# Patient Record
Sex: MALE | Race: WHITE
[De-identification: names, ages, dates, MRNs, and addresses within clinical notes are randomized per-mention and may not be internally consistent; named-entity substitution may affect disease eponyms.]

---

## 2018-10-16 ENCOUNTER — HOSPITAL ENCOUNTER (EMERGENCY)
Dept: HOSPITAL 97 - ER | Age: 58
Discharge: HOME | End: 2018-10-16
Payer: COMMERCIAL

## 2018-10-16 DIAGNOSIS — I10: ICD-10-CM

## 2018-10-16 DIAGNOSIS — R07.9: Primary | ICD-10-CM

## 2018-10-16 LAB
ALBUMIN SERPL BCP-MCNC: 3.8 G/DL (ref 3.4–5)
ALP SERPL-CCNC: 27 U/L (ref 45–117)
ALT SERPL W P-5'-P-CCNC: 40 U/L (ref 12–78)
AST SERPL W P-5'-P-CCNC: 17 U/L (ref 15–37)
BUN BLD-MCNC: 17 MG/DL (ref 7–18)
GLUCOSE SERPLBLD-MCNC: 92 MG/DL (ref 74–106)
HCT VFR BLD CALC: 42.1 % (ref 39.6–49)
INR BLD: 1.01
LYMPHOCYTES # SPEC AUTO: 1 K/UL (ref 0.7–4.9)
MAGNESIUM SERPL-MCNC: 2.3 MG/DL (ref 1.8–2.4)
MCH RBC QN AUTO: 32.2 PG (ref 27–35)
MCV RBC: 91.6 FL (ref 80–100)
NT-PROBNP SERPL-MCNC: 14 PG/ML (ref ?–125)
PMV BLD: 7.8 FL (ref 7.6–11.3)
POTASSIUM SERPL-SCNC: 4.5 MMOL/L (ref 3.5–5.1)
RBC # BLD: 4.6 M/UL (ref 4.33–5.43)
TROPONIN (EMERG DEPT USE ONLY): < 0.02 NG/ML (ref 0–0.04)

## 2018-10-16 PROCEDURE — 84484 ASSAY OF TROPONIN QUANT: CPT

## 2018-10-16 PROCEDURE — 71045 X-RAY EXAM CHEST 1 VIEW: CPT

## 2018-10-16 PROCEDURE — 80048 BASIC METABOLIC PNL TOTAL CA: CPT

## 2018-10-16 PROCEDURE — 80076 HEPATIC FUNCTION PANEL: CPT

## 2018-10-16 PROCEDURE — 36415 COLL VENOUS BLD VENIPUNCTURE: CPT

## 2018-10-16 PROCEDURE — 83735 ASSAY OF MAGNESIUM: CPT

## 2018-10-16 PROCEDURE — 93005 ELECTROCARDIOGRAM TRACING: CPT

## 2018-10-16 PROCEDURE — 83880 ASSAY OF NATRIURETIC PEPTIDE: CPT

## 2018-10-16 PROCEDURE — 85025 COMPLETE CBC W/AUTO DIFF WBC: CPT

## 2018-10-16 PROCEDURE — 85610 PROTHROMBIN TIME: CPT

## 2018-10-16 PROCEDURE — 99285 EMERGENCY DEPT VISIT HI MDM: CPT

## 2018-10-16 NOTE — EKG
Test Date:    2018-10-16               Test Time:    08:59:57

Technician:   LUCIA                                     

                                                     

MEASUREMENT RESULTS:                                       

Intervals:                                           

Rate:         54                                     

MS:           132                                    

QRSD:         90                                     

QT:           432                                    

QTc:          409                                    

Axis:                                                

P:            49                                     

MS:           132                                    

QRS:          19                                     

T:            31                                     

                                                     

INTERPRETIVE STATEMENTS:                                       

                                                     

Sinus bradycardia

Otherwise normal ECG

No previous ECG available for comparison



Electronically Signed On 10-16-18 12:32:51 CDT by Marcelo Woodson

## 2018-10-16 NOTE — EKG
Test Date:    2018-10-16               Test Time:    11:55:35

Technician:   AMAYA                                     

                                                     

MEASUREMENT RESULTS:                                       

Intervals:                                           

Rate:         53                                     

SD:           134                                    

QRSD:         98                                     

QT:           446                                    

QTc:          418                                    

Axis:                                                

P:            45                                     

SD:           134                                    

QRS:          17                                     

T:            26                                     

                                                     

INTERPRETIVE STATEMENTS:                                       

                                                     

Sinus bradycardia

Otherwise normal ECG

Compared to ECG 10/16/2018 08:59:57

No significant changes



Electronically Signed On 10-16-18 12:32:40 CDT by Marcelo Woodson

## 2018-10-16 NOTE — EDPHYS
Physician Documentation                                                                           

 [facility]                                                                

Name: Jose Alberto Cardoza Jr                                                                           

Age: 57 yrs                                                                                       

Sex: Male                                                                                         

: 1960                                                                                   

MRN: [id number]                                                                                   

Arrival Date: 10/16/2018                                                                          

Time: 08:56                                                                                       

Account#: [id number]                                                                            

Bed 16                                                                                            

Private MD:                                                                                       

ED Physician Rishi Lawton                                                                         

HPI:                                                                                              

10/16                                                                                             

09:10 This 57 yrs old  Male presents to ER via Wheelchair with complaints of Chest   cp  

      Pain.                                                                                       

09:10 The patient or guardian reports chest pain that is located primarily in the anterior    cp  

      chest wall, left.                                                                           

09:10 The pain does not radiate. The chest pain is described as sharp. Duration: The patient  cp  

      or guardian reports multiple episodes, that wax and wane, with no pattern. Modifying        

      factors: The symptoms are alleviated by nothing. the symptoms are aggravated by nothing.    

                                                                                                  

Historical:                                                                                       

- Allergies:                                                                                      

09:02 No Known Allergies;                                                                     ch  

- Home Meds:                                                                                      

09:02 Norco Oral [Active]; Soma Oral [Active]; Lisinopril Oral [Active]; Buspirone Oral       ch  

      [Active];                                                                                   

- PMHx:                                                                                           

09:02 Hypertension; wound to LL leg; chronic hip pain;                                        ch  

- PSHx:                                                                                           

09:02 heaven hip replacements; Hernia repair;                                                    ch  

                                                                                                  

- Immunization history:: Adult Immunizations up to date, Last tetanus immunization: not           

  indicated for visit today. Flu vaccine is not up to date.                                       

- Social history:: Smoking status: Patient/guardian denies using tobacco,                         

  Patient/guardian denies using alcohol, street drugs.                                            

- Ebola Screening: : Patient negative for fever greater than or equal to 101.5 degrees            

  Fahrenheit, and additional compatible Ebola Virus Disease symptoms Patient denies               

  exposure to infectious person Patient denies travel to an Ebola-affected area in the            

  21 days before illness onset No symptoms or risks identified at this time.                      

                                                                                                  

                                                                                                  

ROS:                                                                                              

09:15 Constitutional: Negative for body aches, chills, fever, poor PO intake.                 cp  

09:15 Eyes: Negative for injury, pain, redness, and discharge.                                cp  

09:15 ENT: Negative for drainage from ear(s), ear pain, sore throat, difficulty swallowing,       

      difficulty handling secretions.                                                             

09:15 Cardiovascular: Positive for chest pain, Negative for edema, palpitations.                  

09:15 Respiratory: Negative for cough, shortness of breath, wheezing.                             

09:15 Abdomen/GI: Negative for abdominal pain, nausea, vomiting, and diarrhea, black/tarry        

      stool, rectal bleeding.                                                                     

09:15 Back: Negative for pain at rest, pain with movement, radiated pain.                         

09:15 : Negative for urinary symptoms.                                                          

09:15 Skin: Negative for cellulitis, rash.                                                        

09:15 Neuro: Negative for altered mental status, headache, syncope, near syncope, weakness.       

09:15 All other systems are negative.                                                             

                                                                                                  

Exam:                                                                                             

09:05 ECG was reviewed by the Attending Physician.                                            cp  

09:20 Constitutional: The patient appears in no acute distress, alert, awake,                 cp  

      non-diaphoretic, non-toxic, well developed, well nourished.                                 

09:20 Head/Face:  Normocephalic, atraumatic.                                                  cp  

09:20 Eyes: Periorbital structures: appear normal, Conjunctiva: normal, no exudate, no            

      injection, Sclera: no appreciated abnormality, Lids and lashes: appear normal,              

      bilaterally.                                                                                

09:20 ENT: External ear(s): are unremarkable, Nose: is normal, Mouth: is normal.                  

09:20 Neck: ROM/movement: is normal, is supple, without pain, no range of motions                 

      limitations, no nuchal rigidity.                                                            

09:20 Chest/axilla: Inspection: normal, Palpation: is normal, no crepitus, no tenderness.         

09:20 Cardiovascular: Rate: bradycardic, Rhythm: regular, Pulses: Pulses are 2+ in right          

      radial artery and left radial artery. Heart sounds: murmur, not appreciated, rub, not       

      appreciated, gallop, not appreciated, Edema: is not appreciated, JVD: is not                

      appreciated.                                                                                

09:20 Respiratory: the patient does not display signs of respiratory distress,  Respirations:     

      normal, no use of accessory muscles, no retractions, no splinting, no tachypnea,            

      labored breathing, is not present, Breath sounds: are clear throughout, no decreased        

      breath sounds, no stridor, no wheezing.                                                     

09:20 Abdomen/GI: Inspection: abdomen appears normal, Bowel sounds: active, all quadrants,        

      Palpation: abdomen is soft and non-tender, in all quadrants, rebound tenderness, is not     

      appreciated, voluntary guarding, is not appreciated, involuntary guarding, is not           

      appreciated.                                                                                

09:20 Back: pain, is absent, ROM is normal.                                                       

09:20 Skin: cellulitis, is not appreciated, no rash present.                                      

09:20 Neuro: Orientation: to person, place \T\ time. Mentation: is normal, Cerebellar function:   

      is grossly normal, Motor: moves all fours, strength is normal, Sensation: is normal.        

12:03 ECG was reviewed by the Attending Physician.                                            cp  

                                                                                                  

Vital Signs:                                                                                      

09:02 Weight 97.52 kg; Height 6 ft. (182.88 cm); Pain 6/10;                                   ch  

09:37  / 96; Pulse 54; Resp 18; Pulse Ox 97% on R/A;                                    ph  

10:56  / 86; Pulse 56; Resp 16; Pulse Ox 95% on R/A;                                    ph  

12:31  / 96; Pulse 51; Resp 16; Pulse Ox 96% on R/A;                                    ph  

13:21  / 89; Pulse 54; Resp 18; Temp 98.0; Pulse Ox 99% on R/A;                         ph  

09:02 Body Mass Index 29.16 (97.52 kg, 182.88 cm)                                             ch  

09:02 6 in leg, 2 in chest.                                                                   ch  

                                                                                                  

MDM:                                                                                              

08:57 Patient medically screened.                                                             cp  

                                                                                                  

10/16                                                                                             

09:04 Order name: Basic Metabolic Panel; Complete Time: 09:55                                 cp  

10/16                                                                                             

09:55 Interpretation: Normal except: GFR 63.                                                  cp  

10/16                                                                                             

09:04 Order name: CBC with Diff; Complete Time: 09:48                                         cp  

10/16                                                                                             

09:48 Interpretation: Normal except: EOSINOPHIL % 5.0.                                        cp  

10/16                                                                                             

09:04 Order name: LFT's; Complete Time: 09:55                                                 cp  

10/16                                                                                             

09:56 Interpretation: Normal except: ALK 27.                                                  cp  

10/16                                                                                             

09:04 Order name: Magnesium; Complete Time: 09:55                                             cp  

10/16                                                                                             

09:04 Order name: NT PRO-BNP; Complete Time: 09:55                                            cp  

10/16                                                                                             

09:04 Order name: PT-INR; Complete Time: 09:48                                                cp  

10/16                                                                                             

09:04 Order name: Troponin (emerg Dept Use Only); Complete Time: 09:55                        cp  

10/16                                                                                             

09:55 Interpretation: Within normal limits: TROPED < 0.02.                                    cp  

10/16                                                                                             

09:04 Order name: XRAY Chest (1 view); Complete Time: 13:08                                   cp  

10/16                                                                                             

09:04 Order name: EKG; Complete Time: 09:05                                                   cp  

10/16                                                                                             

09:04 Order name: Cardiac monitoring; Complete Time: 09:30                                    cp  

10/16                                                                                             

09:04 Order name: EKG - Nurse/Tech; Complete Time: 09:31                                      cp  

10/16                                                                                             

11:48 Order name: EKG; Complete Time: 11:48                                                   cp  

10/16                                                                                             

11:48 Order name: Troponin I; Complete Time: 13:08                                            cp  

10/16                                                                                             

09:04 Order name: IV Saline Lock; Complete Time: 09:31                                        cp  

10/16                                                                                             

09:04 Order name: Labs collected and sent; Complete Time: 09:31                               cp  

10/16                                                                                             

09:04 Order name: O2 Per Protocol; Complete Time: 09:31                                       cp  

10/16                                                                                             

09:04 Order name: O2 Sat Monitoring; Complete Time: 09:38                                     cp  

10/16                                                                                             

11:48 Order name: EKG - Nurse/Tech; Complete Time: 12:31                                      cp  

                                                                                                  

EC:05 Rate is 54 beats/min. Rhythm is regular. NJ interval is normal. QRS interval is normal. cp  

      QT interval is normal. Interpreted by me. Reviewed by me.                                   

12:03 Rate is 53 beats/min. Rhythm is regular. NJ interval is normal. QRS interval is normal. cp  

      QT interval is normal. Interpreted by me. Reviewed by me.                                   

                                                                                                  

Administered Medications:                                                                         

09:23 Drug: Aspirin Chewable Tablet 324 mg Route: PO;                                         ph  

10:54 Follow up: Response: No adverse reaction                                                ph  

                                                                                                  

                                                                                                  

Disposition:                                                                                      

16:09 Co-signature as Attending Physician, Rishi Lawton MD.                                    rn  

                                                                                                  

Disposition:                                                                                      

10/16/18 13:09 Discharged to Home. Impression: Chest pain, unspecified.                           

- Condition is Stable.                                                                            

- Discharge Instructions: Nonspecific Chest Pain, Aspirin and Your Heart.                         

                                                                                                  

- Medication Reconciliation Form, Thank You Letter, Antibiotic Education, Prescription            

  Opioid Use form.                                                                                

- Follow up: Shiraz Taylor MD; When: 1 - 2 days; Reason: chest pain.                             

- Problem is new.                                                                                 

- Symptoms have improved.                                                                         

                                                                                                  

                                                                                                  

                                                                                                  

Signatures:                                                                                       

Dispatcher [employer]                           EDMS                                                 

Keri Cruz RN RN ch Nieto, Roman, MD MD rn Hall, Patricia, RN RN ph Page, Corey, PA PA cp                                                   

                                                                                                  

Corrections: (The following items were deleted from the chart)                                    

13:22 13:09 10/16/2018 13:09 Discharged to Home. Impression: Chest pain, unspecified.         ph  

      Condition is Stable. Forms are Medication Reconciliation Form, Thank You Letter,            

      Antibiotic Education, Prescription Opioid Use. Follow up: Shiraz Taylor; When: 1 - 2         

      days; Reason: chest pain. Problem is new. Symptoms have improved. cp                        

                                                                                                  

**************************************************************************************************

## 2018-10-16 NOTE — ER
Nurse's Notes                                                                                     

 [facility]                                                                

Name: Jose Alberto Cardoza Jr                                                                           

Age: 57 yrs                                                                                       

Sex: Male                                                                                         

: 1960                                                                                   

MRN: [id number]                                                                                   

Arrival Date: 10/16/2018                                                                          

Time: 08:56                                                                                       

Account#: [id number]                                                                            

Bed 16                                                                                            

Private MD:                                                                                       

Diagnosis: Chest pain, unspecified                                                                

                                                                                                  

Presentation:                                                                                     

10/16                                                                                             

08:59 Presenting complaint: Patient states: chest pains on and off for 2 years. I was in        

      wound healing today for my Lleg wound, for 6 weeks. I told them I was having slight         

      chest pains and Dr. Edwards said to come to the ER. my leg hurts at a 6, my chest          

      hurts at a 2. Transition of care: patient was not received from another setting of          

      care. Onset of symptoms was . Risk Assessment: Do you want to hurt yourself or          

      someone else? Patient reports no desire to harm self or others. Risk Assessment: Do you     

      want to hurt yourself or someone else?. Initial Sepsis Screen: Does the patient meet        

      any 2 criteria? No. Patient's initial sepsis screen is negative. Does the patient have      

      a suspected source of infection? No. Patient's initial sepsis screen is negative. Care      

      prior to arrival: None.                                                                     

08:59 Method Of Arrival: Wheelchair                                                             

08:59 Acuity: SHANTELL 3                                                                             

                                                                                                  

Triage Assessment:                                                                                

09:02 General: Appears in no apparent distress. comfortable, Behavior is calm, cooperative,   ch  

      appropriate for age. Pain: Complains of pain in chest and left leg.                         

                                                                                                  

Historical:                                                                                       

- Allergies:                                                                                      

09:02 No Known Allergies;                                                                     ch  

- Home Meds:                                                                                      

09:02 Norco Oral [Active]; Soma Oral [Active]; Lisinopril Oral [Active]; Buspirone Oral       ch  

      [Active];                                                                                   

- PMHx:                                                                                           

09:02 Hypertension; wound to LL leg; chronic hip pain;                                          

- PSHx:                                                                                           

09:02 heaven hip replacements; Hernia repair;                                                      

                                                                                                  

- Immunization history:: Adult Immunizations up to date, Last tetanus immunization: not           

  indicated for visit today. Flu vaccine is not up to date.                                       

- Social history:: Smoking status: Patient/guardian denies using tobacco,                         

  Patient/guardian denies using alcohol, street drugs.                                            

- Ebola Screening: : Patient negative for fever greater than or equal to 101.5 degrees            

  Fahrenheit, and additional compatible Ebola Virus Disease symptoms Patient denies               

  exposure to infectious person Patient denies travel to an Ebola-affected area in the            

  21 days before illness onset No symptoms or risks identified at this time.                      

                                                                                                  

                                                                                                  

Screenin:15 Abuse screen: Denies threats or abuse. Denies injuries from another. Nutritional        ph  

      screening: No deficits noted. Tuberculosis screening: No symptoms or risk factors           

      identified. Fall Risk None identified.                                                      

                                                                                                  

Assessment:                                                                                       

09:33 General: Appears in no apparent distress. comfortable, slender, well groomed, Behavior  ph  

      is calm, cooperative, appropriate for age. Pain: Complains of pain in left leg and          

      chest Pain does not radiate. Pain began chest pain began approx 2 years ago and is          

      currently rated 2/10, leg pain began 6 weeks ago and is rated 6/10. Neuro: Level of         

      Consciousness is awake, alert, obeys commands, Oriented to person, place, time,             

      situation. Cardiovascular: Reports chest pain, Denies diaphoresis, lightheadedness,         

      nausea, palpitations, shortness of breath, Capillary refill < 3 seconds in bilateral        

      fingers Patient's skin is warm and dry. Rhythm is sinus bradycardia Chest pain is           

      described as mild, quality is pressure, stabbing, is located in substernal area began 2     

      years ago episodes are intermittent. Respiratory: Airway is patent Respiratory effort       

      is even, unlabored, Respiratory pattern is regular, symmetrical, Breath sounds are          

      clear bilaterally. Denies shortness of breath at rest. GI: No signs and/or symptoms         

      were reported involving the gastrointestinal system. Derm: Skin is healthy with good        

      turgor, Skin is pink, warm \T\ dry. Wound noted left shin. Musculoskeletal: Circulation,    

      motion, and sensation intact. Range of motion: intact in all extremities.                   

10:57 Reassessment: Patient appears in no apparent distress at this time. Patient and/or      ph  

      family updated on plan of care and expected duration. Pain level reassessed. Patient is     

      alert, oriented x 3, equal unlabored respirations, skin warm/dry/pink. Pt resting           

      quietly, awaiting test results, no complaints at this time, VSS, will continue to           

      monitor.                                                                                    

12:32 Reassessment: Patient appears in no apparent distress at this time. Patient and/or      ph  

      family updated on plan of care and expected duration. Pain level reassessed. Patient is     

      alert, oriented x 3, equal unlabored respirations, skin warm/dry/pink. Pt resting           

      quietly, awaiting results of repeat troponin.                                               

                                                                                                  

Vital Signs:                                                                                      

09:02 Weight 97.52 kg; Height 6 ft. (182.88 cm); Pain 6/10;                                   ch  

09:37  / 96; Pulse 54; Resp 18; Pulse Ox 97% on R/A;                                    ph  

10:56  / 86; Pulse 56; Resp 16; Pulse Ox 95% on R/A;                                    ph  

12:31  / 96; Pulse 51; Resp 16; Pulse Ox 96% on R/A;                                    ph  

13:21  / 89; Pulse 54; Resp 18; Temp 98.0; Pulse Ox 99% on R/A;                         ph  

09:02 Body Mass Index 29.16 (97.52 kg, 182.88 cm)                                             ch  

09:02 6 in leg, 2 in chest.                                                                     

                                                                                                  

ED Course:                                                                                        

08:56 Patient arrived in ED.                                                                  as  

08:57 Gregory Snow PA is PHCP.                                                                cp  

08:57 Rishi Lawton MD is Attending Physician.                                                cp  

09:00 Triage completed.                                                                       ch  

09:02 Arm band placed on left wrist. Patient placed in an exam room, on a stretcher, on pulse ch  

      oximetry. EKG completed in triage. Results shown to MD.                                     

09:10 Reyna Sapp, RN is Primary Nurse.                                                    ph  

09:15 Patient has correct armband on for positive identification. Bed in low position. Call   ph  

      light in reach. Side rails up X 1. Cardiac monitor on. Pulse ox on. NIBP on.                

09:17 Inserted saline lock: 22 gauge in left antecubital area, using aseptic technique.       ph  

09:18 Patient maintains SpO2 saturation greater than 95% on room air.                         ph  

09:33 EKG done, by EKG tech. reviewed by Gregory MACKAY.                                       dt2 

09:58 X-ray completed. Portable x-ray completed in exam room. Patient tolerated procedure     jb2 

      well.                                                                                       

10:01 XRAY Chest (1 view) In Process Unspecified.                                             EDMS

10:58 No provider procedures requiring assistance completed.                                  ph  

11:59 EKG done, by EKG tech. reviewed by Gregory MACKAY.                                       at1 

13:09 Shiraz Taylor MD is Referral Physician.                                                cp  

13:22 IV discontinued, intact, bleeding controlled, No redness/swelling at site. Pressure     ph  

      dressing applied.                                                                           

                                                                                                  

Administered Medications:                                                                         

09:23 Drug: Aspirin Chewable Tablet 324 mg Route: PO;                                         ph  

10:54 Follow up: Response: No adverse reaction                                                ph  

                                                                                                  

                                                                                                  

Outcome:                                                                                          

13:09 Discharge ordered by MD.                                                                cp  

13:21 Discharged to home ambulatory.                                                          ph  

13:21 Condition: good                                                                             

13:21 Discharge instructions given to patient, Instructed on discharge instructions, follow       

      up and referral plans. medication usage, Demonstrated understanding of instructions,        

      follow-up care, medications.                                                                

13:22 Patient left the ED.                                                                    ph  

                                                                                                  

Signatures:                                                                                       

Dispatcher [employer]                           EDMS                                                 

Keri Cruz, MCKAYLA                  RN                                                      

Zach Oscar                              jb2                                                  

Ena Madrid Amanda, EKG Tech              EKG Tat1                                                  

Reyna Sapp RN                      RN   ph                                                   

Gregory Snow, PA                         PA   Argentina Murphy                             dt2                                                  

                                                                                                  

**************************************************************************************************

## 2018-10-16 NOTE — RAD REPORT
EXAM DESCRIPTION:  RAD - Chest Single View - 10/16/2018 10:01 am

 

CLINICAL HISTORY:  CHEST PAIN

Chest pain.

 

COMPARISON:  No comparisons

 

FINDINGS:  Portable technique limits examination quality.

 

The lungs are grossly clear. The heart is normal in size. No displaced fractures.

 

IMPRESSION:  No acute intrathoracic process suspected.